# Patient Record
(demographics unavailable — no encounter records)

---

## 2024-10-29 NOTE — ASSESSMENT
[Patient Optimized for Surgery] : Patient optimized for surgery [No Further Testing Recommended] : no further testing recommended [As per surgery] : as per surgery within normal limits

## 2024-10-29 NOTE — PHYSICAL EXAM
[No Acute Distress] : no acute distress [Well Nourished] : well nourished [Normal Outer Ear/Nose] : the outer ears and nose were normal in appearance [Normal Oropharynx] : the oropharynx was normal [No Respiratory Distress] : no respiratory distress  [No Accessory Muscle Use] : no accessory muscle use [Clear to Auscultation] : lungs were clear to auscultation bilaterally [Normal Rate] : normal rate  [Regular Rhythm] : with a regular rhythm [Normal S1, S2] : normal S1 and S2 [No Murmur] : no murmur heard [Soft] : abdomen soft [Non Tender] : non-tender [Non-distended] : non-distended [Coordination Grossly Intact] : coordination grossly intact [No Focal Deficits] : no focal deficits [Normal Gait] : normal gait [Normal] : normal gait, coordination grossly intact, no focal deficits and deep tendon reflexes were 2+ and symmetric

## 2024-10-30 NOTE — HISTORY OF PRESENT ILLNESS
[Good (7-10 METs)] : Good (7-10 METs) [NSAIDs: _____] : NSAIDs: [unfilled] [Parent] : parent [Aortic Stenosis] : no aortic stenosis [Atrial Fibrillation] : no atrial fibrillation [Coronary Artery Disease] : no coronary artery disease [Recent Myocardial Infarction] : no recent myocardial infarction [Implantable Device/Pacemaker] : no implantable device/pacemaker [Asthma] : no asthma [COPD] : no COPD [Sleep Apnea] : no sleep apnea [Smoker] : not a smoker [Family Member] : no family member with adverse anesthesia reaction/sudden death [Self] : no previous adverse anesthesia reaction [Chronic Anticoagulation] : no chronic anticoagulation [Chronic Kidney Disease] : no chronic kidney disease [Diabetes] : no diabetes [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  Dental procedure: Anesthesia and deep clean  Patient has not had a general anesthesia deep clean before No surgeries no anesthesia in the past  used metoprolol in the past & Ibuprofen 400x2 during periods but stopped 2 months ago Tylenol was recommended as an alternative prior to surgery No cardio or pulmonary symptoms including shortness of breath, chest pain, pressure, palpitations, lightheadedness/dizziness, orthopnea, cough, wheeze, leg swelling/pain No heme onc risk including bleeding, bruising, or clots in the past Np ID risks no fevers, chills, rhinorrhea, cough, sore throat, n/v, abdominal pain, soars or ulcer No allergies     [FreeTextEntry6] : no chest pain, slight dyspnea on exertion, no orthopnea, no palpitations or syncope  [FreeTextEntry7] : Goes about ADLs more than 4 METS

## 2024-10-30 NOTE — END OF VISIT
[] : Resident [FreeTextEntry3] : Parent present during encounter. provded medical info.mother  to provide this office with "requested /required" testing ( if any) per surgeon

## 2024-10-30 NOTE — PLAN
[FreeTextEntry1] : 3 y/o F w/ PMH of cerebral palsy with developmental delay (mother assists patients with ADLs and medical decision making), lymphocytic esophagitis, thyroid nodule, Grave's Disease presenting for MCA prior to dental procedure.  Deep clean with anesthesia  No medical comorbidities  Patient without any Cardiac, pulmonary, heme/onc, or ID risk factors at this point in time No medication changes as the patient is only on methimazole but will not take ibuprofen close to the operative date Optimized for procedure at this point in time  Plan: Continue with the procedure as planned reminded not to give nsaid prior to surgery   HCM Graves and hyperthyroidism with thyroid nodule  TSH and CBC check due to methimazole use

## 2024-10-30 NOTE — HISTORY OF PRESENT ILLNESS
[Good (7-10 METs)] : Good (7-10 METs) [NSAIDs: _____] : NSAIDs: [unfilled] [Parent] : parent [Aortic Stenosis] : no aortic stenosis [Atrial Fibrillation] : no atrial fibrillation [Coronary Artery Disease] : no coronary artery disease [Recent Myocardial Infarction] : no recent myocardial infarction [Implantable Device/Pacemaker] : no implantable device/pacemaker [Asthma] : no asthma [COPD] : no COPD [Sleep Apnea] : no sleep apnea [Smoker] : not a smoker [Family Member] : no family member with adverse anesthesia reaction/sudden death [Self] : no previous adverse anesthesia reaction [Chronic Anticoagulation] : no chronic anticoagulation [Chronic Kidney Disease] : no chronic kidney disease [Diabetes] : no diabetes [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  Dental procedure: Anesthesia and deep clean  Patient has not had a general anesthesia deep clean before No surgeries no anesthesia in the past  used metoprolol in the past & Ibuprofen 400x2 during periods but stopped 2 months ago Tylenol was recommended as an alternative prior to surgery No cardio or pulmonary symptoms including shortness of breath, chest pain, pressure, palpitations, lightheadedness/dizziness, orthopnea, cough, wheeze, leg swelling/pain No heme onc risk including bleeding, bruising, or clots in the past Np ID risks no fevers, chills, rhinorrhea, cough, sore throat, n/v, abdominal pain, soars or ulcer No allergies     [FreeTextEntry6] : no chest pain, slight dyspnea on exertion, no orthopnea, no palpitations or syncope  [FreeTextEntry7] : Goes about ADLs more than 4 METS

## 2024-11-06 NOTE — HISTORY OF PRESENT ILLNESS
[FreeTextEntry1] : 35 yo F with history of cerebral palsy presents for follow up of Graves Disease.  Accompanied by mother who is the patient's caregiver and provides history  Interval history for 11/5/24 visit: Reports regular menses. Reports weight gain of 5 lbs. No other acute complaints. Scheduled for deep oral cleaning with anesthesia tomorrow. Is looking into thyroidectomy as option. Is compliant with methimazole 10 mg once daily.   Latest labs: TSH 0.95 (10/29)  Graves History Patient was first diagnosed with Graves Disease in July 2017 when she was hospitalized with agitation, insomnia, tremors, polyphagia. TSH receptor Ab and TPO positive. Thyroid ultrasound performed at that time revealed heterogenous hypervascular gland, no nodules noted. No hx of thyroid eye disease or visual complaints.  TFTs have varied over the past several years, requiring MMI 2.5 to 10 mg. Methimazole was never stopped.  Most recently Feb 2023, TSH 3.11 and MMI was decreased from 10 mg to 5 mg daily  TSI (+) 2.54 June 2021 -> 1.32 Aug 2022  A repeat thyroid US was performed in Aug 2022 showed several nodules. RMP 1.9 x 1.0 x 1.5 cm mixed nodule RMP 1.1 x 0.5 x 0.9 cm mixed nodule LMP 0.6 x 0.4 x 0.8 cm mixed nodule Planned for FNA of mixed 1.9 cm nodule, but has to be done with IR due to inability to tolerate in office. Per mom, they have been having difficulty scheduling this  Today patient accompanied by mother. Reports patient is doing well overwell. Not reporting insomnia, patient appetite is back to normal and she is eating very well, gaining weight. Mother does not report anxiousness, tremors, weight changes. Continued chronic constipation. 6/23 US thyroid with mildly heterogenous and mildly hypevascular thyroid gland, stable small beingn left midpole spongiform appearing nodule measuring 0.4x0.6x0.5cm 11/23 TSH 0.01, FT4 2.4, TT3 180, increased to methimazole 10mg once daily from 5mg once daily  1/2/24 visit - reports needs to make apt for US thyroid still - has been taking methimazole 10mg once daily, endorses compliance since 11/23 - denies palpitations, tremors, loose stools, heat or cold intolerance, blurry vision, neck pain, dysphonia, irregular menses, fatigue, skin changes, hair loss - reports occasional constipation, occasional dysphagia - appetite is good, 3lb weight loss since 11/21/23 - unsure last time saw ophthalmology

## 2024-11-06 NOTE — ASSESSMENT
[FreeTextEntry1] : Ms. Robertson is a 33 yo F with history of cerebral palsy presents for follow up of Graves Disease and thyroid nodule.  #Graves' disease -on treatment since 2017 -antibodies negative on last check  - continue methimazole 10mg once daily - check FT4 -patient has been referred un the past for total thyroidectomy. Information for 2 different doctors provided. Not a candidate for radioactive iodine therapy as she cannot isolate.  - check hepatic function panel  - risk of agranulocytosis as well as signs/symptoms reviewed with patient while on methimazole  #thyroid nodule PLAN -thyroid US with spongiform nodules. No FNA required based on size and lack of suspicious features. (upcoming new ADE guidelines do not recommend biopsy in the absence of suspicious features on sonogram.)  RTC in 4 months.  Discussed with attending physician Dr. Marilyn Stafford DO PGY-4 endocrine fellow

## 2024-11-06 NOTE — END OF VISIT
H&P reviewed  After examining the patient I find no changes in the patients condition since the H&P had been written  Will proceed with TURBT, left ureteral catheter placement  Risks and benefits reviewed today  [] : Fellow [FreeTextEntry3] : Graves Disease on methimazole x 7 years, controlled. Considering surgical treatment. Spongiform nodules without suspicious features, no biopsy needed.

## 2024-11-06 NOTE — PHYSICAL EXAM
[Alert] : alert [No Acute Distress] : no acute distress [Normal Sclera/Conjunctiva] : normal sclera/conjunctiva [No Proptosis] : no proptosis [No Neck Mass] : no neck mass was observed [No LAD] : no lymphadenopathy [Thyroid Not Enlarged] : the thyroid was not enlarged [No Thyroid Nodules] : no palpable thyroid nodules [No Respiratory Distress] : no respiratory distress [Clear to Auscultation] : lungs were clear to auscultation bilaterally [Normal S1, S2] : normal S1 and S2 [Regular Rhythm] : with a regular rhythm [Normal Bowel Sounds] : normal bowel sounds [Not Tender] : non-tender [Soft] : abdomen soft [No Stigmata of Cushings Syndrome] : no stigmata of Cushings Syndrome [No Involuntary Movements] : no involuntary movements were seen [No Skin Lesions] : no skin lesions [No Motor Deficits] : the motor exam was normal [No Tremors] : no tremors [Normal Affect] : the affect was normal [Normal Mood] : the mood was normal [Acanthosis Nigricans] : no acanthosis nigricans

## 2024-11-06 NOTE — REVIEW OF SYSTEMS
[As Noted in HPI] : as noted in HPI [Chest Pain] : no chest pain [Shortness Of Breath] : no shortness of breath [Nausea] : no nausea [Abdominal Pain] : no abdominal pain [Vomiting] : no vomiting [Muscle Weakness] : no muscle weakness [Myalgia] : no myalgia

## 2024-11-06 NOTE — END OF VISIT
[] : Fellow [FreeTextEntry3] : Graves Disease on methimazole x 7 years, controlled. Considering surgical treatment. Spongiform nodules without suspicious features, no biopsy needed.

## 2025-01-08 NOTE — PHYSICAL EXAM
[Coordination Grossly Intact] : coordination grossly intact [Normal] : affect was normal and insight and judgment were intact [de-identified] : Thinning/ grey hair, NAD, VSS [de-identified] : No thyroid nodules palpated [de-identified] : no suprapubic or cva tenderness

## 2025-01-08 NOTE — HISTORY OF PRESENT ILLNESS
[FreeTextEntry1] : CPE [de-identified] : Marcelo Sultana 35 yo f pmh graves, cerebral palsy, lymphocytic esophagitis here for CPE visit.   Doing well overall no new thyroid, esophageal, or constitutional symptoms. 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 The scheduling has changed due to the holiday season. She denies any trouble sleeping or any specific symptoms at night. As per her mother the patient has normal energy levels, and continued chronic constipation, but no changes in skin, nails, eyes, ears, or heart, with possible heat/cold intolerance but unable to ascertain due to the patient's inability to express her symptoms.  The patient tolerated the recent dental procedure well. She had a deep clean under sedation with the removal of 4 teeth without any complications.  HCM vaccines none requested today gardasil up to date as per OBGYN pap deffered with obgyn but patient low risk overall  Recent Hepatic and T4 WNL Lipids elevated past july 2024 will need lifestyle changes implemented at this point in time US thyroid or thyroid function not needed and labs to early to collect. Full set of lab work will be collected with next visit to endocrinology as it was recently done in July of 2024.   No Changes to PMH, Medications, FH, SH at this point in time

## 2025-01-08 NOTE — PHYSICAL EXAM
[Coordination Grossly Intact] : coordination grossly intact [Normal] : affect was normal and insight and judgment were intact [de-identified] : Thinning/ grey hair, NAD, VSS [de-identified] : No thyroid nodules palpated [de-identified] : no suprapubic or cva tenderness

## 2025-01-08 NOTE — REVIEW OF SYSTEMS
[Negative] : Heme/Lymph [FreeTextEntry2] : possible warm/cold intolerance as per family [FreeTextEntry7] : Chronic constipation

## 2025-01-08 NOTE — PLAN
Recorded Pressure: Ao, HR=63, Condition=Condition 1 (Aorta) Ao 184/71/112 [FreeTextEntry1] : Marcelo Sultana 33 yo f pmh graves, cerebral palsy, lymphocytic esophagitis here for CPE visit.  Graves  No new symptoms with the current 10mg methimazole regimen  T4 Recently checked as per Endo WNL  Plan: Continue current methimazole 10mg regimen  No need for labs at this point in time   HCM Mostly up to date   Plan: Due for flu and covid but differed  Gardasil up to date and pap not needed as per OBGYN Labs will be done with next endocrine appointment as closer to 1 year in time from previous full set of labs  RTC in 3-6 months per patient and family member request

## 2025-01-08 NOTE — HISTORY OF PRESENT ILLNESS
[FreeTextEntry1] : CPE [de-identified] : Marcelo Sultana 35 yo f pmh graves, cerebral palsy, lymphocytic esophagitis here for CPE visit.   Doing well overall no new thyroid, esophageal, or constitutional symptoms. 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 The scheduling has changed due to the holiday season. She denies any trouble sleeping or any specific symptoms at night. As per her mother the patient has normal energy levels, and continued chronic constipation, but no changes in skin, nails, eyes, ears, or heart, with possible heat/cold intolerance but unable to ascertain due to the patient's inability to express her symptoms.  The patient tolerated the recent dental procedure well. She had a deep clean under sedation with the removal of 4 teeth without any complications.  HCM vaccines none requested today gardasil up to date as per OBGYN pap deffered with obgyn but patient low risk overall  Recent Hepatic and T4 WNL Lipids elevated past july 2024 will need lifestyle changes implemented at this point in time US thyroid or thyroid function not needed and labs to early to collect. Full set of lab work will be collected with next visit to endocrinology as it was recently done in July of 2024.   No Changes to PMH, Medications, FH, SH at this point in time

## 2025-01-08 NOTE — PLAN
[FreeTextEntry1] : Marcelo Sultana 33 yo f pmh graves, cerebral palsy, lymphocytic esophagitis here for CPE visit.  Graves  No new symptoms with the current 10mg methimazole regimen  T4 Recently checked as per Endo WNL  Plan: Continue current methimazole 10mg regimen  No need for labs at this point in time   HCM Mostly up to date   Plan: Due for flu and covid but differed  Gardasil up to date and pap not needed as per OBGYN Labs will be done with next endocrine appointment as closer to 1 year in time from previous full set of labs  RTC in 3-6 months per patient and family member request

## 2025-03-04 NOTE — ASSESSMENT
[FreeTextEntry1] : Ms. Robertson is a 33 yo F with history of cerebral palsy presents for follow up of Graves Disease and thyroid nodule.  #Graves' disease - on treatment since 2017 - antibodies negative on last check (5/2023) - 11/2024: FT4 1.0, TSH 0.94, LFTs wnl - continued on methimazole 10mg daily - cllinically euthyroid on exam PLAN - continue methimazole 10mg once daily - check FT4, TSH, TT3, TSI, TSHrAb, CBC, CMP - discussed total thyroidectomy - patient's mother open to it if needed.  Not a candidate for radioactive iodine as she cannot isolate.  - Will continue to try and treat with medications with plan to have SLOW downtitration when warranted to prevent rebound hyperthyroidism. - risk of agranulocytosis as well as signs/symptoms reviewed with patient while on methimazole  #thyroid nodule PLAN -thyroid US with spongiform nodules. No FNA required based on size and lack of suspicious features. (upcoming new ADE guidelines do not recommend biopsy in the absence of suspicious features on sonogram.) - repeat Thyroid US 2 years after last one (next due 1/2026)  RTC in 4 months.  Discussed with attending physician Dr. Marilyn Do MD Endocrinology Fellow - PGY-5

## 2025-03-04 NOTE — END OF VISIT
[] : Fellow [FreeTextEntry3] : Graves Disease on methimazole x 7 years, controlled. Discussed surgical treatment in past. Present plan is to continue methimazole. Spongiform nodules without suspicious features, no biopsy needed.

## 2025-03-04 NOTE — PHYSICAL EXAM
[Alert] : alert [No Acute Distress] : no acute distress [Normal Sclera/Conjunctiva] : normal sclera/conjunctiva [No Proptosis] : no proptosis [No Neck Mass] : no neck mass was observed [No LAD] : no lymphadenopathy [No Respiratory Distress] : no respiratory distress [Clear to Auscultation] : lungs were clear to auscultation bilaterally [Normal S1, S2] : normal S1 and S2 [Regular Rhythm] : with a regular rhythm [Normal Bowel Sounds] : normal bowel sounds [Not Tender] : non-tender [Soft] : abdomen soft [No Stigmata of Cushings Syndrome] : no stigmata of Cushings Syndrome [No Involuntary Movements] : no involuntary movements were seen [No Skin Lesions] : no skin lesions [No Motor Deficits] : the motor exam was normal [No Tremors] : no tremors [Normal Affect] : the affect was normal [Normal Mood] : the mood was normal [Acanthosis Nigricans] : no acanthosis nigricans [de-identified] : Thyroid mildly enlarged bilaterally

## 2025-03-04 NOTE — HISTORY OF PRESENT ILLNESS
[FreeTextEntry1] : 33 yo F with history of cerebral palsy presents for follow up of Graves Disease.  Accompanied by mother who is the patient's caregiver and provides history  Graves History Patient was first diagnosed with Graves Disease in July 2017 when she was hospitalized with agitation, insomnia, tremors, polyphagia. TSH receptor Ab and TPO positive. Thyroid ultrasound performed at that time revealed heterogenous hypervascular gland, no nodules noted. No hx of thyroid eye disease or visual complaints. Has been on methimazole since 2017  TFTs have varied over the past several years, requiring MMI 2.5 to 10 mg.    6/2021: TSI (+) 2.54  8/2022: TSI (+) 1.32  2/2023, TSH 3.11 - MMI was decreased from 10 mg to 5 mg daily  5/2023: TSHrAb 1.1 (wnl), TSI 0.41 (wnl) 11/23 TSH 0.01, FT4 2.4, TT3 180, increased to methimazole 10mg once daily from 5mg once daily 11/2024: FT4 1.0, TSH 0.94, LFTs wnl - continued on methimazole 10mg daily  todays visit: - has been taking methimazole 10mg once daily, has been on MMI since 2017, varying dose - denies palpitations, tremors, loose stools, heat or cold intolerance, blurry vision, neck pain, dysphonia, irregular menses, fatigue, skin changes, hair loss - regular menses, every 26 days - reports occasional constipation - appetite is good, weight stable - unsure last time saw ophthalmology, denies eye irritation  #Thyroid Nodules Thyroid US Aug 2022 showed several nodules. RMP 1.9 x 1.0 x 1.5 cm mixed nodule RMP 1.1 x 0.5 x 0.9 cm mixed nodule LMP 0.6 x 0.4 x 0.8 cm mixed nodule Planned for FNA of mixed 1.9 cm nodule, but has to be done with IR due to inability to tolerate in office. Per mom, they have been having difficulty scheduling this  6/23 US thyroid with mildly heterogenous and mildly hypevascular thyroid gland, stable small beingn left midpole spongiform appearing nodule measuring 0.4x0.6x0.5cm  1/2024:  1. RMP There is 1.8 x 1.4 x 0.6 cm spongiform benign-appearing nodule in the midpole, not significantly changed. (TIRAD-1). 2. RLP 0.9 x 0.8 x 0.7 cm spongiform benign-appearing nodule in the lower pole, not significantly changed. (TIRAD-1). 3. LMP 0.7 x 0.8 x 0.4 cm spongiform benign-appearing nodule in the midpole, unchanged. (TIRAD-1).  Todays visit  - denies dysphagia, neck enlargement

## 2025-07-05 NOTE — HISTORY OF PRESENT ILLNESS
[FreeTextEntry1] : RPA [de-identified] : 34 yo F pmh graves, cerebral palsy, lymphocytic esophagitis here for CPE visit.  No new symptoms or problems to discuss at this point in time.   Would like [facility] for [facility] healthcare maintenance sheet filled out prior to enrolling.   Exam wnl. ROS wnl. Labs hepatitis and quant gold ordered for HCM. TSH Ft4 and T3/TSH antibodies are ordered to be done july 8th for next endocrine visit.

## 2025-07-05 NOTE — ASSESSMENT
[Vaccines Reviewed] : Immunizations reviewed today. Please see immunization details in the vaccine log within the immunization flowsheet.  [FreeTextEntry1] : 34 yo F pmh graves, cerebral palsy, lymphocytic esophagitis here for CPE visit.  Graves continue methimazole endocrine visit july 8th  tsh and tsh antibodies ordered and were directed to be taken prior to endo visit.   HCM CBC, Hepatitis labs, quant gold, tsh/tsh antibodies  Will fill out hcm form for the patients [facility] program Continue to follow up OBGYN for women's health care  patient's family member will bring vaccination record for hepatitis  TDAP due but patient not amenable to vaccination at this time but will request nursing appt for vaccination

## 2025-07-05 NOTE — ASSESSMENT
[Vaccines Reviewed] : Immunizations reviewed today. Please see immunization details in the vaccine log within the immunization flowsheet.  [FreeTextEntry1] : 34 yo F pmh graves, cerebral palsy, lymphocytic esophagitis here for CPE visit.  Graves continue methimazole endocrine visit july 8th  tsh and tsh antibodies ordered and were directed to be taken prior to endo visit.   HCM CBC, Hepatitis labs, quant gold, tsh/tsh antibodies  Will fill out hcm form for the patients [facility] program Continue to follow up OBGYN for women's health care  patient's family member will bring vaccination record for hepatitis  TDAP due but patient not amenable to vaccination at this time but will request nursing appt for vaccination

## 2025-07-05 NOTE — HISTORY OF PRESENT ILLNESS
[FreeTextEntry1] : RPA [de-identified] : 36 yo F pmh graves, cerebral palsy, lymphocytic esophagitis here for CPE visit.  No new symptoms or problems to discuss at this point in time.   Would like [facility] for [facility] healthcare maintenance sheet filled out prior to enrolling.   Exam wnl. ROS wnl. Labs hepatitis and quant gold ordered for HCM. TSH Ft4 and T3/TSH antibodies are ordered to be done july 8th for next endocrine visit.

## 2025-07-09 NOTE — DISCUSSION/SUMMARY
[FreeTextEntry1] : Labs 5/23/25: T3 106, TSH 0.29, FT4 1.2, TSHrAb 2.3  Patient currently on methimazole 5mg daily. Plan to continue methimazole 5mg daily given TSH low-normal and antibodies are still elevated. 90 day refill of methimazole 5mg sent to pharmacy. Will repeat TSH , free T4 and TSHrab. Script given to mother to obtain labs at Outside lab. Discussed case w/ Dr. Marilyn Ch, DO  PGY 4 Endocrinology Fellow

## 2025-07-09 NOTE — PHYSICAL EXAM
[Alert] : alert [Well Developed] : well developed [Normal Sclera/Conjunctiva] : normal sclera/conjunctiva [No Proptosis] : no proptosis [No Lid Lag] : no lid lag [No Respiratory Distress] : no respiratory distress [Clear to Auscultation] : lungs were clear to auscultation bilaterally [Normal S1, S2] : normal S1 and S2 [Regular Rhythm] : with a regular rhythm [No Edema] : no peripheral edema [Normal Appearance] : normal in appearance [Normal Bowel Sounds] : normal bowel sounds [Not Tender] : non-tender [No Stigmata of Cushings Syndrome] : no stigmata of Cushings Syndrome [No Rash] : no rash [No Skin Lesions] : no skin lesions [Hirsutism] : hirsutism present [Normal Mood] : the mood was normal [No Acute Distress] : no acute distress [No Neck Mass] : no neck mass was observed [No LAD] : no lymphadenopathy [Soft] : abdomen soft [No Involuntary Movements] : no involuntary movements were seen [No Motor Deficits] : the motor exam was normal [No Tremors] : no tremors [Normal Affect] : the affect was normal [Abdominal Striae] : no abdominal striae [Acanthosis Nigricans] : no acanthosis nigricans [de-identified] : Thyroid mildly enlarged bilaterally [de-identified] : Speech limitations

## 2025-07-09 NOTE — HISTORY OF PRESENT ILLNESS
[FreeTextEntry1] : 33 yo F with history of cerebral palsy presents for follow up of Graves Disease.  Accompanied by mother who is the patient's caregiver and provides history  Graves History Patient was first diagnosed with Graves Disease in July 2017 when she was hospitalized with agitation, insomnia, tremors, polyphagia. TSH receptor Ab and TPO positive. Thyroid ultrasound performed at that time revealed heterogenous hypervascular gland, no nodules noted. No hx of thyroid eye disease or visual complaints. Has been on methimazole since 2017  TFTs have varied over the past several years, requiring MMI 2.5 to 10 mg.    6/2021: TSI (+) 2.54  8/2022: TSI (+) 1.32  2/2023, TSH 3.11 - MMI was decreased from 10 mg to 5 mg daily  5/2023: TSHrAb 1.1 (wnl), TSI 0.41 (wnl) 11/23 TSH 0.01, FT4 2.4, TT3 180, increased to methimazole 10mg once daily from 5mg once daily 11/2024: FT4 1.0, TSH 0.94, LFTs wnl - continued on methimazole 10mg daily 03/2025: TSH 1.69, FT40.9, TT3 104 TSH rab positive (3.4)  Interval history - has been taking methimazole 5mg once daily, has been on MMI since 2017, varying dose - denies palpitations, tremors, loose stools, heat or cold intolerance, blurry vision, neck pain, dysphonia, irregular menses, fatigue, skin changes, hair loss - regular menses, every 26 days - reports occasional constipation - appetite is good, weight stable - unsure last time saw ophthalmology, denies eye irritation  #Thyroid Nodules Thyroid US Aug 2022 showed several nodules. RMP 1.9 x 1.0 x 1.5 cm mixed nodule RMP 1.1 x 0.5 x 0.9 cm mixed nodule LMP 0.6 x 0.4 x 0.8 cm mixed nodule   6/23 US thyroid with mildly heterogenous and mildly hypevascular thyroid gland, stable small beingn left midpole spongiform appearing nodule measuring 0.4x0.6x0.5cm. No indication for FNA given benign nature of spongiform nodules.  1/2024:  1. RMP There is 1.8 x 1.4 x 0.6 cm spongiform benign-appearing nodule in the midpole, not significantly changed. (TIRAD-1). 2. RLP 0.9 x 0.8 x 0.7 cm spongiform benign-appearing nodule in the lower pole, not significantly changed. (TIRAD-1). 3. LMP 0.7 x 0.8 x 0.4 cm spongiform benign-appearing nodule in the midpole, unchanged. (TIRAD-1).  Todays visit  - denies dysphagia, neck enlargement

## 2025-07-09 NOTE — END OF VISIT
[] : Fellow [FreeTextEntry3] : Graves Disease controlled on varying doses of methimazole x 8 years with persistence of TSH receptor abs. Alternative Rx discussed with decision to continue medical Rx for now. Clinically benign spongiform nodules in both lobes

## 2025-07-09 NOTE — PHYSICAL EXAM
[Alert] : alert [Well Developed] : well developed [Normal Sclera/Conjunctiva] : normal sclera/conjunctiva [No Proptosis] : no proptosis [No Lid Lag] : no lid lag [No Respiratory Distress] : no respiratory distress [Clear to Auscultation] : lungs were clear to auscultation bilaterally [Normal S1, S2] : normal S1 and S2 [Regular Rhythm] : with a regular rhythm [No Edema] : no peripheral edema [Normal Appearance] : normal in appearance [Normal Bowel Sounds] : normal bowel sounds [Not Tender] : non-tender [No Stigmata of Cushings Syndrome] : no stigmata of Cushings Syndrome [No Rash] : no rash [No Skin Lesions] : no skin lesions [Hirsutism] : hirsutism present [Normal Mood] : the mood was normal [No Acute Distress] : no acute distress [No Neck Mass] : no neck mass was observed [No LAD] : no lymphadenopathy [Soft] : abdomen soft [No Involuntary Movements] : no involuntary movements were seen [No Motor Deficits] : the motor exam was normal [No Tremors] : no tremors [Normal Affect] : the affect was normal [Abdominal Striae] : no abdominal striae [Acanthosis Nigricans] : no acanthosis nigricans [de-identified] : Thyroid mildly enlarged bilaterally [de-identified] : Speech limitations

## 2025-07-09 NOTE — PHYSICAL EXAM
[Alert] : alert [Well Developed] : well developed [Normal Sclera/Conjunctiva] : normal sclera/conjunctiva [No Proptosis] : no proptosis [No Lid Lag] : no lid lag [No Respiratory Distress] : no respiratory distress [Clear to Auscultation] : lungs were clear to auscultation bilaterally [Normal S1, S2] : normal S1 and S2 [Regular Rhythm] : with a regular rhythm [No Edema] : no peripheral edema [Normal Appearance] : normal in appearance [Normal Bowel Sounds] : normal bowel sounds [Not Tender] : non-tender [No Stigmata of Cushings Syndrome] : no stigmata of Cushings Syndrome [No Rash] : no rash [No Skin Lesions] : no skin lesions [Hirsutism] : hirsutism present [Normal Mood] : the mood was normal [No Acute Distress] : no acute distress [No Neck Mass] : no neck mass was observed [No LAD] : no lymphadenopathy [Soft] : abdomen soft [No Involuntary Movements] : no involuntary movements were seen [No Motor Deficits] : the motor exam was normal [No Tremors] : no tremors [Normal Affect] : the affect was normal [Abdominal Striae] : no abdominal striae [Acanthosis Nigricans] : no acanthosis nigricans [de-identified] : Thyroid mildly enlarged bilaterally [de-identified] : Speech limitations

## 2025-07-09 NOTE — ASSESSMENT
[FreeTextEntry1] : Ms. Robertson is a 33 yo F with history of cerebral palsy presents for follow up of Graves Disease and thyroid nodule.  #Graves' disease - on treatment since 2017 - TSH receptor antibodies positive on last check (5/2025) - cllinically euthyroid on exam.  PLAN - continue methimazole 5mg once daily. - Script given to obtain FT4, TSH, TT3, TSI, TSHrAb, CBC at outside lab. Will review results when they become available and make any necessary changes. -Per prior  discussions with patient's mother in 03/2025, she is open to  total thyroidectomy if needed.  Not a candidate for radioactive iodine as she cannot isolate.  - Will continue to try and treat with medications with plan to have SLOW downtitration when warranted to prevent rebound hyperthyroidism. - risk of agranulocytosis as well as signs/symptoms reviewed with patient while on methimazole  #thyroid nodule PLAN -thyroid US with spongiform nodules. No FNA required based on size and lack of suspicious features. (upcoming new ADE guidelines do not recommend biopsy in the absence of suspicious features on sonogram.) - repeat Thyroid US 2 years after last one (next due 1/2026)  RTC in 4 months.  Discussed with attending physician Dr. Marilyn Ch, Endocrinology Fellow - PGY-4

## 2025-07-09 NOTE — HISTORY OF PRESENT ILLNESS
[FreeTextEntry1] : 35 yo F with history of cerebral palsy presents for follow up of Graves Disease.  Accompanied by mother who is the patient's caregiver and provides history  Graves History Patient was first diagnosed with Graves Disease in July 2017 when she was hospitalized with agitation, insomnia, tremors, polyphagia. TSH receptor Ab and TPO positive. Thyroid ultrasound performed at that time revealed heterogenous hypervascular gland, no nodules noted. No hx of thyroid eye disease or visual complaints. Has been on methimazole since 2017  TFTs have varied over the past several years, requiring MMI 2.5 to 10 mg.    6/2021: TSI (+) 2.54  8/2022: TSI (+) 1.32  2/2023, TSH 3.11 - MMI was decreased from 10 mg to 5 mg daily  5/2023: TSHrAb 1.1 (wnl), TSI 0.41 (wnl) 11/23 TSH 0.01, FT4 2.4, TT3 180, increased to methimazole 10mg once daily from 5mg once daily 11/2024: FT4 1.0, TSH 0.94, LFTs wnl - continued on methimazole 10mg daily 03/2025: TSH 1.69, FT40.9, TT3 104 TSH rab positive (3.4)  Interval history - has been taking methimazole 5mg once daily, has been on MMI since 2017, varying dose - denies palpitations, tremors, loose stools, heat or cold intolerance, blurry vision, neck pain, dysphonia, irregular menses, fatigue, skin changes, hair loss - regular menses, every 26 days - reports occasional constipation - appetite is good, weight stable - unsure last time saw ophthalmology, denies eye irritation  #Thyroid Nodules Thyroid US Aug 2022 showed several nodules. RMP 1.9 x 1.0 x 1.5 cm mixed nodule RMP 1.1 x 0.5 x 0.9 cm mixed nodule LMP 0.6 x 0.4 x 0.8 cm mixed nodule   6/23 US thyroid with mildly heterogenous and mildly hypevascular thyroid gland, stable small beingn left midpole spongiform appearing nodule measuring 0.4x0.6x0.5cm. No indication for FNA given benign nature of spongiform nodules.  1/2024:  1. RMP There is 1.8 x 1.4 x 0.6 cm spongiform benign-appearing nodule in the midpole, not significantly changed. (TIRAD-1). 2. RLP 0.9 x 0.8 x 0.7 cm spongiform benign-appearing nodule in the lower pole, not significantly changed. (TIRAD-1). 3. LMP 0.7 x 0.8 x 0.4 cm spongiform benign-appearing nodule in the midpole, unchanged. (TIRAD-1).  Todays visit  - denies dysphagia, neck enlargement

## 2025-07-09 NOTE — PHYSICAL EXAM
[Alert] : alert [Well Developed] : well developed [Normal Sclera/Conjunctiva] : normal sclera/conjunctiva [No Proptosis] : no proptosis [No Lid Lag] : no lid lag [No Respiratory Distress] : no respiratory distress [Clear to Auscultation] : lungs were clear to auscultation bilaterally [Normal S1, S2] : normal S1 and S2 [Regular Rhythm] : with a regular rhythm [No Edema] : no peripheral edema [Normal Appearance] : normal in appearance [Normal Bowel Sounds] : normal bowel sounds [Not Tender] : non-tender [No Stigmata of Cushings Syndrome] : no stigmata of Cushings Syndrome [No Rash] : no rash [No Skin Lesions] : no skin lesions [Hirsutism] : hirsutism present [Normal Mood] : the mood was normal [No Acute Distress] : no acute distress [No Neck Mass] : no neck mass was observed [No LAD] : no lymphadenopathy [Soft] : abdomen soft [No Involuntary Movements] : no involuntary movements were seen [No Motor Deficits] : the motor exam was normal [No Tremors] : no tremors [Normal Affect] : the affect was normal [Abdominal Striae] : no abdominal striae [Acanthosis Nigricans] : no acanthosis nigricans [de-identified] : Thyroid mildly enlarged bilaterally [de-identified] : Speech limitations